# Patient Record
Sex: MALE | Race: WHITE | Employment: UNEMPLOYED | URBAN - METROPOLITAN AREA
[De-identification: names, ages, dates, MRNs, and addresses within clinical notes are randomized per-mention and may not be internally consistent; named-entity substitution may affect disease eponyms.]

---
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
## 2023-01-24 RX ORDER — FLUTICASONE PROPIONATE 50 MCG
2 SPRAY, SUSPENSION (ML) NASAL DAILY
Qty: 1 EACH | Refills: 1 | Status: SHIPPED | OUTPATIENT
Start: 2023-01-24

## 2023-03-01 RX ORDER — CLINDAMYCIN AND BENZOYL PEROXIDE 10; 50 MG/G; MG/G
GEL TOPICAL
Qty: 25 G | OUTPATIENT
Start: 2023-03-01

## 2023-05-24 RX ORDER — FLUTICASONE PROPIONATE 50 MCG
2 SPRAY, SUSPENSION (ML) NASAL DAILY
COMMUNITY
Start: 2023-01-24 | End: 2023-06-06

## 2023-05-24 RX ORDER — DEXTROAMPHETAMINE SACCHARATE, AMPHETAMINE ASPARTATE MONOHYDRATE, DEXTROAMPHETAMINE SULFATE AND AMPHETAMINE SULFATE 5; 5; 5; 5 MG/1; MG/1; MG/1; MG/1
20 CAPSULE, EXTENDED RELEASE ORAL
COMMUNITY
Start: 2021-06-25

## 2023-05-24 RX ORDER — CETIRIZINE HYDROCHLORIDE 10 MG/1
1 TABLET ORAL DAILY
COMMUNITY
Start: 2022-10-01

## 2023-06-06 ENCOUNTER — OFFICE VISIT (OUTPATIENT)
Age: 18
End: 2023-06-06

## 2023-06-06 VITALS
TEMPERATURE: 97.2 F | DIASTOLIC BLOOD PRESSURE: 69 MMHG | RESPIRATION RATE: 18 BRPM | SYSTOLIC BLOOD PRESSURE: 108 MMHG | HEIGHT: 70 IN | BODY MASS INDEX: 22.3 KG/M2 | OXYGEN SATURATION: 98 % | WEIGHT: 155.8 LBS | HEART RATE: 76 BPM

## 2023-06-06 DIAGNOSIS — J01.00 ACUTE NON-RECURRENT MAXILLARY SINUSITIS: Primary | ICD-10-CM

## 2023-06-06 PROCEDURE — 99213 OFFICE O/P EST LOW 20 MIN: CPT | Performed by: NURSE PRACTITIONER

## 2023-06-06 RX ORDER — AMOXICILLIN AND CLAVULANATE POTASSIUM 875; 125 MG/1; MG/1
1 TABLET, FILM COATED ORAL 2 TIMES DAILY
Qty: 14 TABLET | Refills: 0 | Status: SHIPPED | OUTPATIENT
Start: 2023-06-06 | End: 2023-06-13

## 2023-06-06 ASSESSMENT — ENCOUNTER SYMPTOMS
GASTROINTESTINAL NEGATIVE: 1
WHEEZING: 0
SHORTNESS OF BREATH: 0
RESPIRATORY NEGATIVE: 1

## 2023-06-06 NOTE — PROGRESS NOTES
Chief Complaint   Patient presents with    URI     X1.5 weeks; congestion/cough/yellow mucus/headache/ears popping; no home covid testing; taking sudafed, nyquil, and dayquil     HISTORY OF PRESENT ILLNESS  Isac Sosa is a 19 y.o. male. Onset 5/29 with upper resp symptoms. Now continues with sinus congestion . Hx of asthma, no cough, sob, or wheezing. Denies fever, body aches, or chills. Review of Systems   Constitutional: Negative. HENT:  Positive for congestion. Respiratory: Negative. Negative for shortness of breath and wheezing. Gastrointestinal: Negative. Musculoskeletal:  Negative for arthralgias. Past Medical History:   Diagnosis Date    ADHD     ADHD 4/27/2020    Asthma 2009    activity indused    Ill-defined condition     RSV     Otitis media     Vision decreased      Past Surgical History:   Procedure Laterality Date    OTHER SURGICAL HISTORY      Cyst L. eye removed     OTHER SURGICAL HISTORY  2009    cyst removed from left eyelid    TYMPANOSTOMY TUBE PLACEMENT  2004     Physical Exam  Vitals and nursing note reviewed. Constitutional:       Appearance: He is not ill-appearing. HENT:      Right Ear: Tympanic membrane normal.      Left Ear: Tympanic membrane normal.      Mouth/Throat:      Pharynx: Oropharynx is clear. Cardiovascular:      Rate and Rhythm: Normal rate and regular rhythm. Pulmonary:      Effort: Pulmonary effort is normal. No respiratory distress. Breath sounds: Normal breath sounds. No wheezing, rhonchi or rales. Neurological:      Mental Status: He is alert. Vitals:    06/06/23 1632   BP: 108/69   Pulse: 76   Resp: 18   Temp: 97.2 °F (36.2 °C)   SpO2: 98%     ASSESSMENT and PLAN  Miles Blanco was seen today for uri. Diagnoses and all orders for this visit:    Acute non-recurrent maxillary sinusitis  -     amoxicillin-clavulanate (AUGMENTIN) 875-125 MG per tablet;  Take 1 tablet by mouth 2 times daily for 7 days       Continue Zyrtec, add

## 2023-06-06 NOTE — PROGRESS NOTES
Chief Complaint   Patient presents with    URI     X1.5 weeks; congestion/cough/yellow mucus/headache/ears popping; no home covid testing; taking sudafed, nyquil, and dayquil     Vitals:    06/06/23 1632   BP: 108/69   Pulse: 76   Resp: 18   Temp: 97.2 °F (36.2 °C)   SpO2: 98%     1. Have you been to the ER, urgent care clinic since your last visit? Hospitalized since your last visit? No    2. Have you seen or consulted any other health care providers outside of the [address] [place] [address] since your last visit? Include any pap smears or colon screening.  No

## 2024-03-05 NOTE — PATIENT INSTRUCTIONS
HPI     Annual Exam            Comments: RTC 1 yr for dilated eye exam  Vision changes since last eye exam?: minor changes     Any eye pain today: no    Other ocular symptoms: same old floaters    Interested in contact lens fitting today? Yes. Pt is happy with current   contacts                     Last edited by Ruth Vázquez on 3/5/2024  1:35 PM.            Assessment /Plan     For exam results, see Encounter Report.    Pseudophakia of both eyes  PCO (posterior capsular opacification), left  Stable OU  Mild PCO OS, YAG not yet indicated  Monitor 12 months    Myopia with astigmatism and presbyopia, bilateral  Eyeglass Final Rx       Eyeglass Final Rx         Sphere Cylinder Axis Add    Right -0.75 +0.25 165 +2.50    Left Wall +1.00 170 +2.50      Expiration Date: 3/5/2025                  Contact Lens Prescription (3/5/2024)          Brand Base Curve Diameter Sphere    Right Acuvue Oasys 1 Day 8.5 14.3 +1.75    Left no lens         Expiration Date: 3/5/2025    Replacement: Daily    Wearing Schedule: Daily Wear            Dispensed trial contact lenses today (Precision 1 +1.75). Patient is to wear lenses for 1 week.   Ok to order supply if no problems. RTC PRN if any problems arise.    Otherwise, RTC 1 yr for dilated eye exam.  Discussed above and answered questions.                      Swollen Lymph Nodes in Children: Care Instructions  Your Care Instructions    Lymph nodes are small, bean-shaped glands throughout the body. They help the body fight germs and infections. Many things can cause the lymph nodes to swell. In most cases, swollen lymph nodes are not serious. Sometimes lymph nodes can swell when there is an infection in the area. For example, the lymph nodes in the neck, under the chin, or behind the ears may swell and hurt a little when your child has a cold or sore throat. And an injury or infection in a leg or foot can make the lymph nodes in your child's groin swell. Treatment depends on what caused your child's lymph nodes to swell. In most cases, the lymph nodes return to normal size on their own after the cause is gone. It may take a few weeks before the swelling goes away. If the swollen lymph nodes are caused by an infection, your doctor may prescribe antibiotics. Follow-up care is a key part of your child's treatment and safety. Be sure to make and go to all appointments, and call your doctor if your child is having problems. It's also a good idea to know your child's test results and keep a list of the medicines your child takes. How can you care for your child at home? · If the doctor prescribed antibiotics for your child, give them as directed. Do not stop using them just because he or she feels better. Your child needs to take the full course of antibiotics. · Do not squeeze, drain, or puncture a painful lump. Doing this can irritate or inflame the lump, push any existing infection deeper into your child's skin, or cause severe bleeding. And make sure your child does not squeeze or pick at the lump. · Make sure your child drinks plenty of fluids, enough so that his or her urine is light yellow or clear like water.   · If your child has pain from the swollen lymph nodes, give your child an over-the-counter pain medicine, such as acetaminophen (Tylenol) or ibuprofen (Advil, Motrin). Be safe with medicines. Read and follow all instructions on the label. Do not give aspirin to anyone younger than 20. It has been linked to Reye syndrome, a serious illness. · Do not give your child two or more pain medicines at the same time unless the doctor told you to. Many pain medicines have acetaminophen, which is Tylenol. Too much acetaminophen (Tylenol) can be harmful. When should you call for help? Call your doctor now or seek immediate medical care if:    · Your child has worse symptoms of infection, such as:  ? Increased pain, swelling, warmth, or redness. ? Red streaks leading from the area. ? Pus draining from the area. ? A fever.    Watch closely for changes in your child's health, and be sure to contact your doctor if:    · Your child's lymph nodes do not get smaller or do not return to normal.     · Your child does not get better as expected. Where can you learn more? Go to http://kit-bushra.info/. Jayshree Agarwal in the search box to learn more about \"Swollen Lymph Nodes in Children: Care Instructions. \"  Current as of: July 30, 2018  Content Version: 11.9  © 7309-5320 [employer]. Care instructions adapted under license by [employer] (which disclaims liability or warranty for this information). If you have questions about a medical condition or this instruction, always ask your healthcare professional. [place] [address] any warranty or liability for your use of this information.